# Patient Record
Sex: FEMALE | URBAN - METROPOLITAN AREA
[De-identification: names, ages, dates, MRNs, and addresses within clinical notes are randomized per-mention and may not be internally consistent; named-entity substitution may affect disease eponyms.]

---

## 2022-12-05 ENCOUNTER — OFFICE (OUTPATIENT)
Dept: URBAN - METROPOLITAN AREA CLINIC 28 | Facility: CLINIC | Age: 82
Setting detail: OPHTHALMOLOGY
End: 2022-12-05
Payer: COMMERCIAL

## 2022-12-05 DIAGNOSIS — H02.834: ICD-10-CM

## 2022-12-05 DIAGNOSIS — H11.153: ICD-10-CM

## 2022-12-05 DIAGNOSIS — H02.831: ICD-10-CM

## 2022-12-05 DIAGNOSIS — H25.13: ICD-10-CM

## 2022-12-05 PROCEDURE — 92014 COMPRE OPH EXAM EST PT 1/>: CPT | Performed by: OPHTHALMOLOGY

## 2022-12-05 ASSESSMENT — AXIALLENGTH_DERIVED
OD_AL: 22.5061
OD_AL: 22.5061
OS_AL: 22.2149

## 2022-12-05 ASSESSMENT — REFRACTION_MANIFEST
OS_CYLINDER: SPHERE
OD_CYLINDER: -0.25
OS_CYLINDER: SPHERE
OD_CYLINDER: SPHERE
OS_CYLINDER: SPHERE
OD_SPHERE: +1.50
OS_SPHERE: +1.75
OD_AXIS: 125
OD_SPHERE: +4.00
OS_SPHERE: +1.50
OS_SPHERE: +4.00
OD_SPHERE: +1.75
OD_VA1: 20/40+
OD_CYLINDER: SPHERE

## 2022-12-05 ASSESSMENT — REFRACTION_CURRENTRX
OS_CYLINDER: -1.00
OS_OVR_VA: 20/
OD_CYLINDER: -1.25
OD_AXIS: 049
OD_OVR_VA: 20/
OS_SPHERE: +4.25
OS_AXIS: 031
OD_SPHERE: +4.25
OD_VPRISM_DIRECTION: SV
OS_VPRISM_DIRECTION: SV

## 2022-12-05 ASSESSMENT — VISUAL ACUITY
OS_BCVA: 20/50
OD_BCVA: 20/70

## 2022-12-05 ASSESSMENT — KERATOMETRY
OS_K1POWER_DIOPTERS: 45.25
OS_AXISANGLE_DEGREES: 132
OD_K1POWER_DIOPTERS: 44.50
OD_K2POWER_DIOPTERS: 45.25
OD_AXISANGLE_DEGREES: 098
OS_K2POWER_DIOPTERS: 46.00

## 2022-12-05 ASSESSMENT — TONOMETRY
OS_IOP_MMHG: 17
OD_IOP_MMHG: 16

## 2022-12-05 ASSESSMENT — REFRACTION_AUTOREFRACTION
OS_AXIS: 053
OD_SPHERE: +1.75
OD_AXIS: 121
OS_SPHERE: +2.25
OD_CYLINDER: -0.25
OS_CYLINDER: -1.00

## 2022-12-05 ASSESSMENT — LID POSITION - DERMATOCHALASIS
OD_DERMATOCHALASIS: RUL
OS_DERMATOCHALASIS: LUL

## 2022-12-05 ASSESSMENT — SPHEQUIV_DERIVED
OD_SPHEQUIV: 1.625
OS_SPHEQUIV: 1.75
OD_SPHEQUIV: 1.625

## 2022-12-05 ASSESSMENT — LID EXAM ASSESSMENTS
OD_MEIBOMITIS: 1+ 2+
OS_MEIBOMITIS: 1+ 2+

## 2023-05-22 ENCOUNTER — OFFICE (OUTPATIENT)
Dept: URBAN - METROPOLITAN AREA CLINIC 28 | Facility: CLINIC | Age: 83
Setting detail: OPHTHALMOLOGY
End: 2023-05-22
Payer: COMMERCIAL

## 2023-05-22 DIAGNOSIS — H25.12: ICD-10-CM

## 2023-05-22 DIAGNOSIS — H25.13: ICD-10-CM

## 2023-05-22 DIAGNOSIS — H02.831: ICD-10-CM

## 2023-05-22 DIAGNOSIS — H11.153: ICD-10-CM

## 2023-05-22 DIAGNOSIS — H02.834: ICD-10-CM

## 2023-05-22 PROBLEM — H25.11 CATARACT SENILE NUCLEAR SCLEROSIS; RIGHT EYE, LEFT EYE, BOTH EYES: Status: ACTIVE | Noted: 2023-05-22

## 2023-05-22 PROCEDURE — 92136 OPHTHALMIC BIOMETRY: CPT | Performed by: OPHTHALMOLOGY

## 2023-05-22 PROCEDURE — 92014 COMPRE OPH EXAM EST PT 1/>: CPT | Performed by: OPHTHALMOLOGY

## 2023-05-22 ASSESSMENT — TONOMETRY
OS_IOP_MMHG: 17
OD_IOP_MMHG: 17

## 2023-05-22 ASSESSMENT — KERATOMETRY
OS_AXISANGLE_DEGREES: 10
OS_CYLAXISANGLE_DEGREES: 100
OD_CYLAXISANGLE_DEGREES: 090
OD_AXISANGLE2_DEGREES: 090
OD_K2POWER_DIOPTERS: 45.50
OS_CYLPOWER_DEGREES: 0.5
OS_K1K2_AVERAGE: 45
OS_AXISANGLE2_DEGREES: 100
OS_K1POWER_DIOPTERS: 44.75
OD_CYLPOWER_DEGREES: 1
OD_K1POWER_DIOPTERS: 44.50
OS_K2POWER_DIOPTERS: 45.25
OD_K1K2_AVERAGE: 45
OD_AXISANGLE_DEGREES: 180

## 2023-05-22 ASSESSMENT — LID POSITION - DERMATOCHALASIS
OS_DERMATOCHALASIS: LUL
OD_DERMATOCHALASIS: RUL

## 2023-05-22 ASSESSMENT — CONFRONTATIONAL VISUAL FIELD TEST (CVF)
OS_FINDINGS: FULL
OD_FINDINGS: FULL

## 2023-05-22 ASSESSMENT — LID EXAM ASSESSMENTS
OD_MEIBOMITIS: 1+ 2+
OS_MEIBOMITIS: 1+ 2+

## 2023-05-23 ASSESSMENT — REFRACTION_MANIFEST
OS_VA1: 20/30+2
OS_SPHERE: +4.00
OS_SPHERE: +1.50
OD_CYLINDER: SPHERE
OS_CYLINDER: SPHERE
OD_SPHERE: +1.25
OD_SPHERE: +4.00
OS_CYLINDER: SPHERE
OD_SPHERE: +1.50
OD_VA1: 20/40+
OD_AXIS: 175
OD_CYLINDER: -0.50
OS_CYLINDER: SPHERE
OS_SPHERE: +1.75
OS_SPHERE: +1.75
OD_VA1: 20/40+
OD_AXIS: 125
OD_SPHERE: +1.75
OD_CYLINDER: -0.25
OS_CYLINDER: SPHERE
OD_CYLINDER: SPHERE

## 2023-05-23 ASSESSMENT — AXIALLENGTH_DERIVED
OD_AL: 22.6874
OD_AL: 22.6874
OS_AL: 22.4644
OD_AL: 22.4644

## 2023-05-23 ASSESSMENT — REFRACTION_AUTOREFRACTION
OD_CYLINDER: -0.50
OS_CYLINDER: -0.25
OS_SPHERE: +1.75
OS_AXIS: 033
OD_SPHERE: +1.25
OD_AXIS: 175

## 2023-05-23 ASSESSMENT — VISUAL ACUITY
OS_BCVA: 20/50
OD_BCVA: 20/70

## 2023-05-23 ASSESSMENT — REFRACTION_CURRENTRX
OD_CYLINDER: -1.25
OD_VPRISM_DIRECTION: SV
OS_SPHERE: +4.25
OS_VPRISM_DIRECTION: SV
OS_OVR_VA: 20/
OS_AXIS: 031
OD_OVR_VA: 20/
OS_CYLINDER: -1.00
OD_SPHERE: +4.25
OD_AXIS: 049

## 2023-05-23 ASSESSMENT — SPHEQUIV_DERIVED
OD_SPHEQUIV: 1
OS_SPHEQUIV: 1.625
OD_SPHEQUIV: 1.625
OD_SPHEQUIV: 1

## 2023-05-23 ASSESSMENT — KERATOMETRY
OD_AXISANGLE_DEGREES: 090
OS_AXISANGLE_DEGREES: 100
OD_K1POWER_DIOPTERS: 44.50
OS_K1POWER_DIOPTERS: 44.75
OS_K2POWER_DIOPTERS: 45.25
OD_K2POWER_DIOPTERS: 45.50

## 2024-11-05 ENCOUNTER — EMERGENCY (EMERGENCY)
Facility: HOSPITAL | Age: 84
LOS: 1 days | Discharge: HOME | End: 2024-11-05
Attending: EMERGENCY MEDICINE | Admitting: EMERGENCY MEDICINE
Payer: MEDICARE

## 2024-11-05 VITALS
TEMPERATURE: 99 F | HEART RATE: 76 BPM | OXYGEN SATURATION: 95 % | SYSTOLIC BLOOD PRESSURE: 150 MMHG | WEIGHT: 130.07 LBS | DIASTOLIC BLOOD PRESSURE: 85 MMHG | RESPIRATION RATE: 18 BRPM | HEIGHT: 65 IN

## 2024-11-05 VITALS
RESPIRATION RATE: 18 BRPM | OXYGEN SATURATION: 95 % | TEMPERATURE: 98 F | DIASTOLIC BLOOD PRESSURE: 83 MMHG | HEART RATE: 74 BPM | SYSTOLIC BLOOD PRESSURE: 149 MMHG

## 2024-11-05 LAB
ALBUMIN SERPL ELPH-MCNC: 3.7 G/DL — SIGNIFICANT CHANGE UP (ref 3.3–5)
ALP SERPL-CCNC: 66 U/L — SIGNIFICANT CHANGE UP (ref 40–120)
ALT FLD-CCNC: 13 U/L — SIGNIFICANT CHANGE UP (ref 10–45)
ANION GAP SERPL CALC-SCNC: 10 MMOL/L — SIGNIFICANT CHANGE UP (ref 5–17)
AST SERPL-CCNC: SIGNIFICANT CHANGE UP U/L (ref 10–40)
BASOPHILS # BLD AUTO: 0 K/UL — SIGNIFICANT CHANGE UP (ref 0–0.2)
BASOPHILS NFR BLD AUTO: 0 % — SIGNIFICANT CHANGE UP (ref 0–2)
BILIRUB SERPL-MCNC: 0.4 MG/DL — SIGNIFICANT CHANGE UP (ref 0.2–1.2)
BUN SERPL-MCNC: 15 MG/DL — SIGNIFICANT CHANGE UP (ref 7–23)
CALCIUM SERPL-MCNC: 8.9 MG/DL — SIGNIFICANT CHANGE UP (ref 8.4–10.5)
CHLORIDE SERPL-SCNC: 98 MMOL/L — SIGNIFICANT CHANGE UP (ref 96–108)
CO2 SERPL-SCNC: 28 MMOL/L — SIGNIFICANT CHANGE UP (ref 22–31)
CREAT SERPL-MCNC: 0.74 MG/DL — SIGNIFICANT CHANGE UP (ref 0.5–1.3)
EGFR: 80 ML/MIN/1.73M2 — SIGNIFICANT CHANGE UP
EOSINOPHIL # BLD AUTO: 0 K/UL — SIGNIFICANT CHANGE UP (ref 0–0.5)
EOSINOPHIL NFR BLD AUTO: 0 % — SIGNIFICANT CHANGE UP (ref 0–6)
FLUAV AG NPH QL: DETECTED
FLUBV AG NPH QL: SIGNIFICANT CHANGE UP
GI PCR PANEL: SIGNIFICANT CHANGE UP
GLUCOSE SERPL-MCNC: 106 MG/DL — HIGH (ref 70–99)
HCT VFR BLD CALC: 52.1 % — HIGH (ref 34.5–45)
HGB BLD-MCNC: 17.5 G/DL — HIGH (ref 11.5–15.5)
LIDOCAIN IGE QN: 29 U/L — SIGNIFICANT CHANGE UP (ref 7–60)
LYMPHOCYTES # BLD AUTO: 0.07 K/UL — LOW (ref 1–3.3)
LYMPHOCYTES # BLD AUTO: 1.7 % — LOW (ref 13–44)
MAGNESIUM SERPL-MCNC: 1.9 MG/DL — SIGNIFICANT CHANGE UP (ref 1.6–2.6)
MCHC RBC-ENTMCNC: 33.6 G/DL — SIGNIFICANT CHANGE UP (ref 32–36)
MCHC RBC-ENTMCNC: 33.6 PG — SIGNIFICANT CHANGE UP (ref 27–34)
MCV RBC AUTO: 100 FL — SIGNIFICANT CHANGE UP (ref 80–100)
MONOCYTES # BLD AUTO: 0.04 K/UL — SIGNIFICANT CHANGE UP (ref 0–0.9)
MONOCYTES NFR BLD AUTO: 0.9 % — LOW (ref 2–14)
NEUTROPHILS # BLD AUTO: 4.04 K/UL — SIGNIFICANT CHANGE UP (ref 1.8–7.4)
NEUTROPHILS NFR BLD AUTO: 97.4 % — HIGH (ref 43–77)
PLATELET # BLD AUTO: 165 K/UL — SIGNIFICANT CHANGE UP (ref 150–400)
POTASSIUM SERPL-MCNC: 4 MMOL/L — SIGNIFICANT CHANGE UP (ref 3.5–5.3)
POTASSIUM SERPL-SCNC: 4 MMOL/L — SIGNIFICANT CHANGE UP (ref 3.5–5.3)
PROT SERPL-MCNC: 7.8 G/DL — SIGNIFICANT CHANGE UP (ref 6–8.3)
RBC # BLD: 5.21 M/UL — HIGH (ref 3.8–5.2)
RBC # FLD: 13.6 % — SIGNIFICANT CHANGE UP (ref 10.3–14.5)
RSV RNA NPH QL NAA+NON-PROBE: SIGNIFICANT CHANGE UP
SARS-COV-2 RNA SPEC QL NAA+PROBE: SIGNIFICANT CHANGE UP
SODIUM SERPL-SCNC: 136 MMOL/L — SIGNIFICANT CHANGE UP (ref 135–145)
WBC # BLD: 4.15 K/UL — SIGNIFICANT CHANGE UP (ref 3.8–10.5)
WBC # FLD AUTO: 4.15 K/UL — SIGNIFICANT CHANGE UP (ref 3.8–10.5)

## 2024-11-05 PROCEDURE — 85025 COMPLETE CBC W/AUTO DIFF WBC: CPT

## 2024-11-05 PROCEDURE — 83735 ASSAY OF MAGNESIUM: CPT

## 2024-11-05 PROCEDURE — 99284 EMERGENCY DEPT VISIT MOD MDM: CPT

## 2024-11-05 PROCEDURE — 87637 SARSCOV2&INF A&B&RSV AMP PRB: CPT

## 2024-11-05 PROCEDURE — 87507 IADNA-DNA/RNA PROBE TQ 12-25: CPT

## 2024-11-05 PROCEDURE — 99283 EMERGENCY DEPT VISIT LOW MDM: CPT | Mod: 25

## 2024-11-05 PROCEDURE — 71045 X-RAY EXAM CHEST 1 VIEW: CPT | Mod: 26

## 2024-11-05 PROCEDURE — 71045 X-RAY EXAM CHEST 1 VIEW: CPT

## 2024-11-05 PROCEDURE — 83690 ASSAY OF LIPASE: CPT

## 2024-11-05 PROCEDURE — 80053 COMPREHEN METABOLIC PANEL: CPT

## 2024-11-05 PROCEDURE — 36415 COLL VENOUS BLD VENIPUNCTURE: CPT

## 2024-11-05 RX ORDER — SODIUM CHLORIDE 9 MG/ML
1000 INJECTION, SOLUTION INTRAMUSCULAR; INTRAVENOUS; SUBCUTANEOUS ONCE
Refills: 0 | Status: COMPLETED | OUTPATIENT
Start: 2024-11-05 | End: 2024-11-05

## 2024-11-05 RX ADMIN — SODIUM CHLORIDE 1000 MILLILITER(S): 9 INJECTION, SOLUTION INTRAMUSCULAR; INTRAVENOUS; SUBCUTANEOUS at 11:50

## 2024-11-05 NOTE — ED PROVIDER NOTE - NSFOLLOWUPINSTRUCTIONS_ED_ALL_ED_FT
Influenza    WHAT YOU NEED TO KNOW:    Influenza (the flu) is an infection caused by the influenza virus. The flu is easily spread when an infected person coughs, sneezes, or has close contact with others. You may be able to spread the flu to others for 1 week or longer after signs or symptoms appear.     DISCHARGE INSTRUCTIONS:    Call your local emergency number (911 in the ) if:     You have trouble breathing, and your lips look purple or blue.      You have a seizure.    Call your doctor if:     You are dizzy, or you are urinating less or not at all.       You have a headache with a stiff neck, and you feel tired or confused.      You have new pain or pressure in your chest.      Your symptoms, such as shortness of breath, vomiting, or diarrhea, get worse.       Your symptoms, such as fever and coughing, seem to get better, but then get worse.       You have new muscle pain or weakness.      You have questions or concerns about your condition or care.    Medicines: You may need any of the following:     Acetaminophen decreases pain and fever. It is available without a doctor's order. Ask how much to take and how often to take it. Follow directions. Read the labels of all other medicines you are using to see if they also contain acetaminophen, or ask your doctor or pharmacist. Acetaminophen can cause liver damage if not taken correctly. Do not use more than 4 grams (4,000 milligrams) total of acetaminophen in one day.       NSAIDs, such as ibuprofen, help decrease swelling, pain, and fever. This medicine is available with or without a doctor's order. NSAIDs can cause stomach bleeding or kidney problems in certain people. If you take blood thinner medicine, always ask your healthcare provider if NSAIDs are safe for you. Always read the medicine label and follow directions.      Antivirals help fight a viral infection.      Take your medicine as directed. Contact your healthcare provider if you think your medicine is not helping or if you have side effects. Tell him or her if you are allergic to any medicine. Keep a list of the medicines, vitamins, and herbs you take. Include the amounts, and when and why you take them. Bring the list or the pill bottles to follow-up visits. Carry your medicine list with you in case of an emergency.    Rest as much as you can to help you recover.    Drink liquids as directed to help prevent dehydration. Ask how much liquid to drink each day and which liquids are best for you.    Prevent the spread of influenza:     Wash your hands often. Use soap and water. Wash your hands after you use the bathroom, change a child's diapers, or sneeze. Wash your hands before you prepare or eat food. Use gel hand cleanser that has 60% alcohol, when soap and water are not available. Do not touch your eyes, nose, or mouth unless you have washed your hands first.Handwashing           Cover your mouth when you sneeze or cough. Cough into a tissue or the bend of your arm. If you use a tissue, throw it away immediately and wash your hands.       Clean shared items with a germ-killing . Clean table surfaces, doorknobs, and light switches. Do not share towels, silverware, and dishes with people who are sick. Wash bed sheets, towels, silverware, and dishes with soap and water.       Wear a mask over your mouth and nose if you are sick. The face mask may help protect others from becoming infected with the flu. Wear the mask when in common areas of your home or if you seek care with a healthcare provider.       Stay away from others if you are sick. Stay at home until 24 hours after your fever and symptoms are gone.      Influenza vaccine helps prevent influenza (flu). Everyone older than 6 months should get a yearly influenza vaccine. Get the vaccine as soon as it is available, usually in September or October each year.    Follow up with your healthcare provider as directed: Write down your questions so you remember to ask them during your visits.

## 2024-11-05 NOTE — ED PROVIDER NOTE - NS ED MD DISPO DISCHARGE
Nursing Note by Pham Lara MA at 06/16/17 07:52 AM     Author:  Pham Lara MA Service:  (none) Author Type:  Medical Assistant     Filed:  06/16/17 07:52 AM Encounter Date:  6/16/2017 Status:  Addendum     :  Pham Lara MA (Medical Assistant)            If provider orders tests at today's visit, patient would like to be contacted via[AG1.1T] phone[AG1.1M] ([facility] or by telephone).  If to contact patient by phone, patient's preferred phone # is [phone] (cell) and it is[AG1.1T] ok[AG1.1M] to leave message on voice mail or with family member.  If medications are ordered at today's visit, the pharmacy name/location patient would like them to be sent to is [facility] [place] [address][AG1.1M]  .[AG1.1T]         Revision History        User Key Date/Time User Provider Type Action    > [N/A] 06/16/17 07:52 AM Pham Lara MA Medical Assistant Addend     AG1.1 06/16/17 07:52 AM Pham Lara MA Medical Assistant Sign    M - Manual, T - Template            
Home

## 2024-11-05 NOTE — ED ADULT NURSE NOTE - CHIEF COMPLAINT QUOTE
Pt presents to [facility] from [facility] c/o diarrhea. Per EMS doorman called 911 pt was found sitting in the elevator, pt reports couldn't make it her room because of the diarrhea and feeling weak. Denies LOC or fall. Pt A&Ox4, NAD. Denies CP, sob, n/v, abd pain.

## 2024-11-05 NOTE — ED ADULT TRIAGE NOTE - CHIEF COMPLAINT QUOTE
Pt presents to [facility] from [facility] c/o diarrhea. Per EMS doorman called 911 pt was found sitting in the elevator, pt reports couldn't make it her room because of the diarrhea and feeling weak. Denies LOC or fall. Pt A&Ox4, NAD. Denies CP, sob, n/v, abd pain.

## 2024-11-05 NOTE — ED ADULT NURSE NOTE - SUICIDE SCREENING QUESTION 3
Refill request received from patient for alprazolam.    LOV 3/9/23  FOV none  Last Fill  5/8/23    Script is loaded and is awaiting MD approval.   No

## 2024-11-05 NOTE — ED ADULT NURSE NOTE - OBJECTIVE STATEMENT
84 y.o female pt a&Ox4 BIBA from home c/o diarrhea x3 days (since Sunday). Per pt, she sat herself down in elevator due to incontinence while waiting for EMS, denies fall, head strike, LOC. Pt denies pain, reports cough x3 days that began at the time of diarrhea. Denies blood in stool, N/V/D, F/C.  Endorses history of HTN, reports non-compliance with HTN medication ("I take it every few days."). Pt reports taking Zoloft for anxiety, took last dose this morning. Pt reports drinking alcohol daily "for a couple of years" (1 1/2 glasses of wine/day), but reports not drinking since experiencing diarrhea x3 days. Upon assessment, pt's clothing visibly soiled with liquid stool. Pt speaking in clear, full sentences, respirations even and unlabored.

## 2024-11-05 NOTE — ED PROVIDER NOTE - OBJECTIVE STATEMENT
83 yo f with pmh of HTN c/o diarrhea x 3 days. Reports several episodes per day of nb diarrhea. Pt states she came back from the supermarket and was in the elevated, felt weak so sat herself down and then her super found her. Associated with nonproductive intermittent cough x 3 days. Reports decreased po intake over the past 3 days. Denies fever, chills, abd pain, n/v, cp, sob, sore throat, rhinorrhea, bodyaches. No recent travel. No sick contacts. No recent abx.

## 2024-11-05 NOTE — ED PROVIDER NOTE - PATIENT PORTAL LINK FT
You can access the FollowMyHealth Patient Portal offered by [facility] by registering at the following website: [email or web address]. By joining [facility]’s FollowMyHealth portal, you will also be able to view your health information using other applications (apps) compatible with our system.

## 2024-11-05 NOTE — ED PROVIDER NOTE - CLINICAL SUMMARY MEDICAL DECISION MAKING FREE TEXT BOX
83 yo f with pmh of HTN c/o diarrhea x 3 days. Reports several episodes per day of nb diarrhea. Pt states she came back from the supermarket and was in the elevated, felt weak so sat herself down and then her super found her. Associated with nonproductive cough x 3 days. Reports decreased po intake over the past 3 days. Denies fever, chills, abd pain, n/v, cp, sob, sore throat, rhinorrhea, bodyaches. Feels okay now. VSS. nontoxic appearing. Lungs clear. abd soft, nt, nd. will check lytes, hydrate, re-assess

## 2024-11-05 NOTE — ED PROVIDER NOTE - PROGRESS NOTE DETAILS
flu A+. discussed tamiflu with pt, pt does not want to take it. Reports feeling better and would like to go home

## 2024-11-05 NOTE — ED PROVIDER NOTE - ATTENDING APP SHARED VISIT CONTRIBUTION OF CARE
I discussed the plan of care of the patient directly with the PA while the patient was in the Emergency Department. I have reviewed the ACP note and agree with the history, exam and plan of care. I performed a substantive portion of the MDM. Pt elderly, tired appearing. Belly soft, non tender. Labs reviewed. hydrated in the ED to good effect.

## 2024-11-08 DIAGNOSIS — I10 ESSENTIAL (PRIMARY) HYPERTENSION: ICD-10-CM

## 2024-11-08 DIAGNOSIS — R19.7 DIARRHEA, UNSPECIFIED: ICD-10-CM

## 2024-11-08 DIAGNOSIS — J10.1 INFLUENZA DUE TO OTHER IDENTIFIED INFLUENZA VIRUS WITH OTHER RESPIRATORY MANIFESTATIONS: ICD-10-CM
